# Patient Record
Sex: MALE | NOT HISPANIC OR LATINO | Employment: FULL TIME | URBAN - METROPOLITAN AREA
[De-identification: names, ages, dates, MRNs, and addresses within clinical notes are randomized per-mention and may not be internally consistent; named-entity substitution may affect disease eponyms.]

---

## 2023-12-24 ENCOUNTER — HOSPITAL ENCOUNTER (EMERGENCY)
Facility: HOSPITAL | Age: 23
Discharge: HOME | End: 2023-12-24
Attending: STUDENT IN AN ORGANIZED HEALTH CARE EDUCATION/TRAINING PROGRAM
Payer: COMMERCIAL

## 2023-12-24 VITALS
HEIGHT: 65 IN | HEART RATE: 97 BPM | BODY MASS INDEX: 29.05 KG/M2 | RESPIRATION RATE: 16 BRPM | WEIGHT: 174.38 LBS | SYSTOLIC BLOOD PRESSURE: 127 MMHG | DIASTOLIC BLOOD PRESSURE: 85 MMHG | OXYGEN SATURATION: 100 % | TEMPERATURE: 98.1 F

## 2023-12-24 DIAGNOSIS — S61.211A LACERATION OF LEFT INDEX FINGER WITHOUT FOREIGN BODY WITHOUT DAMAGE TO NAIL, INITIAL ENCOUNTER: Primary | ICD-10-CM

## 2023-12-24 PROCEDURE — 99282 EMERGENCY DEPT VISIT SF MDM: CPT | Mod: 25

## 2023-12-24 PROCEDURE — 99284 EMERGENCY DEPT VISIT MOD MDM: CPT | Performed by: STUDENT IN AN ORGANIZED HEALTH CARE EDUCATION/TRAINING PROGRAM

## 2023-12-24 PROCEDURE — 2500000004 HC RX 250 GENERAL PHARMACY W/ HCPCS (ALT 636 FOR OP/ED): Performed by: STUDENT IN AN ORGANIZED HEALTH CARE EDUCATION/TRAINING PROGRAM

## 2023-12-24 PROCEDURE — 90715 TDAP VACCINE 7 YRS/> IM: CPT | Performed by: STUDENT IN AN ORGANIZED HEALTH CARE EDUCATION/TRAINING PROGRAM

## 2023-12-24 PROCEDURE — 90471 IMMUNIZATION ADMIN: CPT | Performed by: STUDENT IN AN ORGANIZED HEALTH CARE EDUCATION/TRAINING PROGRAM

## 2023-12-24 RX ORDER — SERTRALINE HYDROCHLORIDE 25 MG/1
TABLET, FILM COATED ORAL
COMMUNITY

## 2023-12-24 RX ORDER — ESCITALOPRAM OXALATE 10 MG/1
TABLET ORAL EVERY 24 HOURS
COMMUNITY

## 2023-12-24 RX ORDER — METHYLPHENIDATE HYDROCHLORIDE 27 MG/1
TABLET ORAL EVERY 24 HOURS
COMMUNITY

## 2023-12-24 RX ADMIN — TETANUS TOXOID, REDUCED DIPHTHERIA TOXOID AND ACELLULAR PERTUSSIS VACCINE, ADSORBED 0.5 ML: 5; 2.5; 8; 8; 2.5 SUSPENSION INTRAMUSCULAR at 07:37

## 2023-12-24 ASSESSMENT — PAIN SCALES - GENERAL
PAINLEVEL_OUTOF10: 8
PAINLEVEL_OUTOF10: 5 - MODERATE PAIN

## 2023-12-24 ASSESSMENT — PAIN - FUNCTIONAL ASSESSMENT
PAIN_FUNCTIONAL_ASSESSMENT: 0-10
PAIN_FUNCTIONAL_ASSESSMENT: 0-10

## 2023-12-24 ASSESSMENT — COLUMBIA-SUICIDE SEVERITY RATING SCALE - C-SSRS
2. HAVE YOU ACTUALLY HAD ANY THOUGHTS OF KILLING YOURSELF?: NO
2. HAVE YOU ACTUALLY HAD ANY THOUGHTS OF KILLING YOURSELF?: NO
6. HAVE YOU EVER DONE ANYTHING, STARTED TO DO ANYTHING, OR PREPARED TO DO ANYTHING TO END YOUR LIFE?: NO
6. HAVE YOU EVER DONE ANYTHING, STARTED TO DO ANYTHING, OR PREPARED TO DO ANYTHING TO END YOUR LIFE?: NO
1. IN THE PAST MONTH, HAVE YOU WISHED YOU WERE DEAD OR WISHED YOU COULD GO TO SLEEP AND NOT WAKE UP?: NO
1. IN THE PAST MONTH, HAVE YOU WISHED YOU WERE DEAD OR WISHED YOU COULD GO TO SLEEP AND NOT WAKE UP?: NO

## 2023-12-24 ASSESSMENT — PAIN DESCRIPTION - LOCATION: LOCATION: FINGER (COMMENT WHICH ONE)

## 2023-12-24 ASSESSMENT — PAIN DESCRIPTION - ORIENTATION: ORIENTATION: LEFT

## 2023-12-24 NOTE — ED PROVIDER NOTES
HPI   Chief Complaint   Patient presents with    Finger Laceration     Pt lacerated the tip of his left index finger around 2100 last night with a knife while opening a bag.  Bleeding controlled.        Patient presents with finger laceration.  He reports that yesterday, he accidentally cut himself with a knife.  He states that the tip of the finger was affected.  He states that he did bandage it last night but as it continues to bleed today, it was recommended that he come to the hospital for a proper dressing.  Medical history significant for asthma and Junior-Parkinson-White status post ablation.                          [place] Coma Scale Score: 15                  Patient History   Past Medical History:   Diagnosis Date    Personal history of other mental and behavioral disorders     History of attention deficit hyperactivity disorder (ADHD)     No past surgical history on file.  No family history on file.  Social History     Tobacco Use    Smoking status: Not on file    Smokeless tobacco: Not on file   Substance Use Topics    Alcohol use: Not on file    Drug use: Not on file       Physical Exam   ED Triage Vitals [12/24/23 0717]   Temp Heart Rate Resp BP   36.7 °C (98.1 °F) 97 16 127/85      SpO2 Temp src Heart Rate Source Patient Position   100 % -- -- --      BP Location FiO2 (%)     -- --       Physical Exam  Cardiovascular:      Rate and Rhythm: Normal rate.   Skin:     Comments: 0.5 cm laceration at tip of left finger involving skin avulsion.  Active bleeding.  Does not involve the nail.   Neurological:      Mental Status: He is alert.         ED Course & MDM   Diagnoses as of 12/24/23 0705   Laceration of left index finger without foreign body without damage to nail, initial encounter       Medical Decision Making  The laceration is extremely distal.  My suspicion of bony involvement, tendon involvement, or neurovascular involvement is very low.  Tetanus vaccine updated.  Surgicel placed.  Patient advised  to follow-up with primary care physician and change dressing daily.  Return precautions given for any signs of infection.  Parts of this chart were completed with dictation software, please excuse any errors in transcription.        Procedure  Procedures     Oz Diaz MD  12/24/23 6972

## 2024-05-28 ENCOUNTER — HOSPITAL ENCOUNTER (EMERGENCY)
Facility: HOSPITAL | Age: 24
Discharge: HOME | End: 2024-05-29
Attending: EMERGENCY MEDICINE
Payer: COMMERCIAL

## 2024-05-28 DIAGNOSIS — F10.929 ALCOHOLIC INTOXICATION WITH COMPLICATION (CMS-HCC): Primary | ICD-10-CM

## 2024-05-28 PROCEDURE — 87635 SARS-COV-2 COVID-19 AMP PRB: CPT | Performed by: EMERGENCY MEDICINE

## 2024-05-28 PROCEDURE — 85025 COMPLETE CBC W/AUTO DIFF WBC: CPT | Performed by: EMERGENCY MEDICINE

## 2024-05-28 PROCEDURE — 80143 DRUG ASSAY ACETAMINOPHEN: CPT | Performed by: EMERGENCY MEDICINE

## 2024-05-28 PROCEDURE — 36415 COLL VENOUS BLD VENIPUNCTURE: CPT | Performed by: EMERGENCY MEDICINE

## 2024-05-28 PROCEDURE — 93005 ELECTROCARDIOGRAM TRACING: CPT

## 2024-05-28 PROCEDURE — 80053 COMPREHEN METABOLIC PANEL: CPT | Performed by: EMERGENCY MEDICINE

## 2024-05-28 PROCEDURE — 99284 EMERGENCY DEPT VISIT MOD MDM: CPT | Mod: CS

## 2024-05-28 NOTE — Clinical Note
Ricardo Harish was seen and treated in our emergency department on 5/28/2024.  He may return to work on 05/30/2024.       If you have any questions or concerns, please don't hesitate to call.      Kunal Stinson MD

## 2024-05-29 ENCOUNTER — APPOINTMENT (OUTPATIENT)
Dept: CARDIOLOGY | Facility: HOSPITAL | Age: 24
End: 2024-05-29
Payer: COMMERCIAL

## 2024-05-29 VITALS
HEART RATE: 83 BPM | RESPIRATION RATE: 18 BRPM | WEIGHT: 190 LBS | OXYGEN SATURATION: 99 % | BODY MASS INDEX: 27.2 KG/M2 | TEMPERATURE: 98.2 F | DIASTOLIC BLOOD PRESSURE: 80 MMHG | HEIGHT: 70 IN | SYSTOLIC BLOOD PRESSURE: 118 MMHG

## 2024-05-29 LAB
ALBUMIN SERPL-MCNC: 4.9 G/DL (ref 3.5–5)
ALP BLD-CCNC: 58 U/L (ref 35–125)
ALT SERPL-CCNC: 19 U/L (ref 5–40)
AMPHETAMINES UR QL SCN>1000 NG/ML: NEGATIVE
ANION GAP SERPL CALC-SCNC: 15 MMOL/L
APAP SERPL-MCNC: <5 UG/ML
AST SERPL-CCNC: 22 U/L (ref 5–40)
BARBITURATES UR QL SCN>300 NG/ML: NEGATIVE
BASOPHILS # BLD AUTO: 0.02 X10*3/UL (ref 0–0.1)
BASOPHILS NFR BLD AUTO: 0.3 %
BENZODIAZ UR QL SCN>300 NG/ML: NEGATIVE
BILIRUB SERPL-MCNC: 0.5 MG/DL (ref 0.1–1.2)
BUN SERPL-MCNC: 16 MG/DL (ref 8–25)
BZE UR QL SCN>300 NG/ML: NEGATIVE
CALCIUM SERPL-MCNC: 9.4 MG/DL (ref 8.5–10.4)
CANNABINOIDS UR QL SCN>50 NG/ML: POSITIVE
CHLORIDE SERPL-SCNC: 103 MMOL/L (ref 97–107)
CO2 SERPL-SCNC: 24 MMOL/L (ref 24–31)
CREAT SERPL-MCNC: 1.1 MG/DL (ref 0.4–1.6)
EGFRCR SERPLBLD CKD-EPI 2021: >90 ML/MIN/1.73M*2
EOSINOPHIL # BLD AUTO: 0.07 X10*3/UL (ref 0–0.7)
EOSINOPHIL NFR BLD AUTO: 1.1 %
ERYTHROCYTE [DISTWIDTH] IN BLOOD BY AUTOMATED COUNT: 11.7 % (ref 11.5–14.5)
ETHANOL SERPL-MCNC: 0.17 G/DL
FENTANYL+NORFENTANYL UR QL SCN: NEGATIVE
GLUCOSE SERPL-MCNC: 115 MG/DL (ref 65–99)
HCT VFR BLD AUTO: 44.2 % (ref 41–52)
HGB BLD-MCNC: 15.9 G/DL (ref 13.5–17.5)
IMM GRANULOCYTES # BLD AUTO: 0.01 X10*3/UL (ref 0–0.7)
IMM GRANULOCYTES NFR BLD AUTO: 0.2 % (ref 0–0.9)
LYMPHOCYTES # BLD AUTO: 2.27 X10*3/UL (ref 1.2–4.8)
LYMPHOCYTES NFR BLD AUTO: 34.2 %
MCH RBC QN AUTO: 31.8 PG (ref 26–34)
MCHC RBC AUTO-ENTMCNC: 36 G/DL (ref 32–36)
MCV RBC AUTO: 88 FL (ref 80–100)
METHADONE UR QL SCN>300 NG/ML: NEGATIVE
MONOCYTES # BLD AUTO: 0.85 X10*3/UL (ref 0.1–1)
MONOCYTES NFR BLD AUTO: 12.8 %
NEUTROPHILS # BLD AUTO: 3.42 X10*3/UL (ref 1.2–7.7)
NEUTROPHILS NFR BLD AUTO: 51.4 %
NRBC BLD-RTO: NORMAL /100{WBCS}
OPIATES UR QL SCN>300 NG/ML: NEGATIVE
OXYCODONE UR QL: NEGATIVE
PCP UR QL SCN>25 NG/ML: NEGATIVE
PLATELET # BLD AUTO: 277 X10*3/UL (ref 150–450)
POTASSIUM SERPL-SCNC: 3.3 MMOL/L (ref 3.4–5.1)
PROT SERPL-MCNC: 7.5 G/DL (ref 5.9–7.9)
RBC # BLD AUTO: 5 X10*6/UL (ref 4.5–5.9)
SALICYLATES SERPL-MCNC: <0 MG/DL
SARS-COV-2 RNA RESP QL NAA+PROBE: NOT DETECTED
SODIUM SERPL-SCNC: 142 MMOL/L (ref 133–145)
WBC # BLD AUTO: 6.6 X10*3/UL (ref 4.4–11.3)

## 2024-05-29 PROCEDURE — 80307 DRUG TEST PRSMV CHEM ANLYZR: CPT | Performed by: EMERGENCY MEDICINE

## 2024-05-29 NOTE — DISCHARGE INSTRUCTIONS
Do not drink alcohol    
Pt with lac to lip. Lac repaired. Will discharge. FB in the lip noted, mother notified. Will discharge.

## 2024-05-29 NOTE — PROGRESS NOTES
"00:00-00:30 CALLY did M&G with pt and pt family. Pt mom, dad and brother were present. 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 Per dad, pt drinks on occasion appx 2-6 hard ciders/seltzers. Per mom, pt doesn't feel his antidepressants are working (Lexapro and Wellbutrin) but pt ADHD/ADD meds (Concentra) are effective. Per family, pt has not verbalized any SI/HI but reported being \"under stress\" with job and working long hours. Per mom, pt sees a psychiatrist at [facility] and pt therapist (who was with [facility], moved to [place] and pt does telehealth EOW). Pt brother advised pt smoked \"a few puffs of weed\" tonight. Pt BAL upon arrival was a .171. SW asked parents if the pt has had phenogenetic testing done, per mom no. SW gave family information on test. ED Doc Milad of M&G. Once pt is legally sober (appx 05:00), SW will consult and offer community resources and coping skills handout.   "

## 2024-05-29 NOTE — PROGRESS NOTES
"05:49-06:05 SW consulted with pt. Pt reported he has been under \"a lot of stress\" due to school and work. 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 Pt denied SI/HI/delusions/AH/VH/intent or plan. Pt reported a decrease in sleep due to ff schedule of 24 hours on. Pt reported his appetite as unchanged. Pt reported since a psychiatrist at [facility]. Pt reported not remembering his name. Pt reported having a therapist he sees via telehealth. Pt reported having a MH dx of depression. Pt reported being prescribed Concerta for ADHD as well as Lexapro and wellbutrin. Pt reported DC his lexapro and wellbutrin for \"a while\" since he felt it \"really didn't work\". Pt reported the meds didn't \"work fast enough\". SW educated to the time antidepressants work. Pt gave verbal consent to send agency notification to [facility] for psych provider to follow up. CALLY gave pt and pt family info on phenogentic testing. Pt was accepting of info and can be DC home .     Gabrielle Elizondo, WANGW      "

## 2024-05-29 NOTE — ED PROVIDER NOTES
"HPI   Chief Complaint   Patient presents with    Acute Intoxication    Depression       HPI     23-year-old male brought to the emergency department by EMS for evaluation of intoxication and saying that \"it is all too much\"    Patient has a history of major depression according to EMS  He shared with EMS that he has been under a great deal of stress    In the emergency department he is not speaking much and is tearful     Denies any headache, chest pain or abdominal pain          [place] Coma Scale Score: 9                     Patient History   Past Medical History:   Diagnosis Date    Celiac disease ([facility]-HCC)     Personal history of other mental and behavioral disorders     History of attention deficit hyperactivity disorder (ADHD)    WPW (Junior-Parkinson-White syndrome)      Past Surgical History:   Procedure Laterality Date    CARDIAC ELECTROPHYSIOLOGY STUDY AND ABLATION       No family history on file.  Social History     Tobacco Use    Smoking status: Never    Smokeless tobacco: Never   Substance Use Topics    Alcohol use: Not on file    Drug use: Not on file       Physical Exam   ED Triage Vitals [05/28/24 2312]   Temperature Heart Rate Respirations BP   36.2 °C (97.2 °F) 88 18 (!) 114/95      Pulse Ox Temp Source Heart Rate Source Patient Position   95 % Temporal -- --      BP Location FiO2 (%)     -- --       Physical Exam  Vitals and nursing note reviewed.   Constitutional:       General: He is not in acute distress.     Appearance: He is well-developed.      Comments: 23-year-old white male brought in by EMS.  Smells of EtOH.  Tearful.  Not speaking much but is cooperative and getting out of the EMS cot and onto the bed   HENT:      Head: Normocephalic and atraumatic.   Eyes:      Conjunctiva/sclera: Conjunctivae normal.   Cardiovascular:      Rate and Rhythm: Normal rate and regular rhythm.      Heart sounds: No murmur heard.  Pulmonary:      Effort: Pulmonary effort is normal. No respiratory distress.    " "  Breath sounds: Normal breath sounds.   Abdominal:      Palpations: Abdomen is soft.      Tenderness: There is no abdominal tenderness.   Musculoskeletal:         General: No swelling.      Cervical back: Neck supple.   Skin:     General: Skin is warm and dry.      Capillary Refill: Capillary refill takes less than 2 seconds.   Neurological:      Mental Status: He is alert.   Psychiatric:         Mood and Affect: Mood is depressed.         Thought Content: Thought content includes suicidal ideation.         ED Course & MDM   ED Course as of 05/29/24 0534   Tue May 28, 2024   2323 Normal sinus rhythm with a rate of 95.  No evidence of ischemia.  No previous EKG for comparison [JN]   2334 Normal sinus rhythm with a rate of 95.  OR interval is 152.  QRS duration is 88  Nonspecific T wave abnormalities [JN]      ED Course User Index  [JN] Kunal Stinson MD         Diagnoses as of 05/29/24 0534   Alcoholic intoxication with complication (CMS-HCC)       Medical Decision Making       23-year-old male brought to the emergency department by EMS for evaluation of intoxication and saying that \"it is all too much\"  Patient has a history of major depression according to EMS  He shared with EMS that he has been under a great deal of stress  In the emergency department he is not speaking much and is tearful  Denies any headache, chest pain or abdominal pain      Physical Exam  Vitals and nursing note reviewed.   Constitutional:       General: He is not in acute distress.     Appearance: He is well-developed.      Comments: 23-year-old white male brought in by EMS.  Smells of EtOH.  Tearful.  Not speaking much but is cooperative and getting out of the EMS cot and onto the bed   HENT:      Head: Normocephalic and atraumatic.   Eyes:      Conjunctiva/sclera: Conjunctivae normal.   Cardiovascular:      Rate and Rhythm: Normal rate and regular rhythm.      Heart sounds: No murmur heard.    Update 3:46 AM  Blood alcohol level " is 171  Patient was seen and evaluated by the psychiatric team who met with the family  No major concerns for suicidality at this time  He will be reevaluated when his blood alcohol level is lower    Update 5:33 AM  Patient is clinically sober  Denying any thoughts of suicide or homicidal ideation  Parents are going to take him home  He will follow-up with his counselor and family doctor or return if worse  Strongly advised to avoid alcohol      Labs Reviewed   COMPREHENSIVE METABOLIC PANEL - Abnormal       Result Value    Glucose 115 (*)     Sodium 142      Potassium 3.3 (*)     Chloride 103      Bicarbonate 24      Urea Nitrogen 16      Creatinine 1.10      eGFR >90      Calcium 9.4      Albumin 4.9      Alkaline Phosphatase 58      Total Protein 7.5      AST 22      Bilirubin, Total 0.5      ALT 19      Anion Gap 15     ACUTE TOXICOLOGY PANEL, BLOOD - Abnormal    Acetaminophen <5.0      Salicylate  <0      Alcohol 0.171 (*)    SARS-COV-2 PCR - Normal    Coronavirus 2019, PCR Not Detected      Narrative:     This assay has received FDA Emergency Use Authorization (EUA) and is only authorized for the duration of time that circumstances exist to justify the authorization of the emergency use of in vitro diagnostic tests for the detection of SARS-CoV-2 virus and/or diagnosis of COVID-19 infection under section 564(b)(1) of the Act, 21 U.S.C. 360bbb-3(b)(1). This assay is an in vitro diagnostic nucleic acid amplification test for the qualitative detection of SARS-CoV-2 from nasopharyngeal specimens and has been validated for use at [facility]. Negative results do not preclude COVID-19 infections and should not be used as the sole basis for diagnosis, treatment, or other management decisions.     CBC WITH AUTO DIFFERENTIAL    WBC 6.6      nRBC        RBC 5.00      Hemoglobin 15.9      Hematocrit 44.2      MCV 88      MCH 31.8      MCHC 36.0      RDW 11.7      Platelets 277      Neutrophils %  51.4      Immature Granulocytes %, Automated 0.2      Lymphocytes % 34.2      Monocytes % 12.8      Eosinophils % 1.1      Basophils % 0.3      Neutrophils Absolute 3.42      Immature Granulocytes Absolute, Automated 0.01      Lymphocytes Absolute 2.27      Monocytes Absolute 0.85      Eosinophils Absolute 0.07      Basophils Absolute 0.02     DRUG SCREEN,URINE   ALCOHOL           Procedure  Procedures     Kunal Stinson MD  05/29/24 8868

## 2024-06-03 LAB
ATRIAL RATE: 95 BPM
P AXIS: 53 DEGREES
P OFFSET: 188 MS
P ONSET: 141 MS
PR INTERVAL: 152 MS
Q ONSET: 217 MS
QRS COUNT: 16 BEATS
QRS DURATION: 88 MS
QT INTERVAL: 342 MS
QTC CALCULATION(BAZETT): 429 MS
QTC FREDERICIA: 398 MS
R AXIS: 61 DEGREES
T AXIS: 0 DEGREES
T OFFSET: 388 MS
VENTRICULAR RATE: 95 BPM

## 2024-07-30 NOTE — DISCHARGE INSTRUCTIONS
Notified pt, states she will call to schedule another appt with us later.    You should change the dressing on your finger daily.  Watch closely for any signs of infection.  Return to the emergency department if any worsening symptoms occur.    It is important to remember that your care does not end here and you must continue to monitor your condition closely. Please return to the emergency department for any worsening or concerning signs or symptoms as directed by our conversations and the discharge instructions. If you do not have a doctor please contact the referral number on your discharge instructions. Please contact any physician specialists provided in your discharge notes as it is very important to follow up with them regarding your condition. If you are unable to reach the physicians provided, please come back to the Emergency Department at any time.    Return to emergency room without delay for ANY new or worsening pains or for any other symptoms or concerns.  Return with worsening pains, nausea, vomiting, trouble breathing, palpitations, shortness of breath, inability to pass stool or urine, loss of control of stool or urine, any numbness or tingling (that is not normal for you), uncontrolled fevers, the passing of blood or other material in stool or urine, rashes, pains or for any other symptoms or concerns you may have.  You are always welcome to return to the ER at any time for any reason or for any other concerns you may have.

## 2024-12-03 ENCOUNTER — HOSPITAL ENCOUNTER (EMERGENCY)
Facility: HOSPITAL | Age: 24
Discharge: HOME | End: 2024-12-03
Payer: COMMERCIAL

## 2024-12-03 ENCOUNTER — PHARMACY VISIT (OUTPATIENT)
Dept: PHARMACY | Facility: CLINIC | Age: 24
End: 2024-12-03
Payer: COMMERCIAL

## 2024-12-03 VITALS
DIASTOLIC BLOOD PRESSURE: 98 MMHG | OXYGEN SATURATION: 97 % | HEART RATE: 72 BPM | RESPIRATION RATE: 20 BRPM | WEIGHT: 192 LBS | TEMPERATURE: 98.7 F | BODY MASS INDEX: 27.55 KG/M2 | SYSTOLIC BLOOD PRESSURE: 139 MMHG
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
## 2024-12-03 PROCEDURE — RXMED WILLOW AMBULATORY MEDICATION CHARGE

## 2024-12-03 PROCEDURE — 99283 EMERGENCY DEPT VISIT LOW MDM: CPT

## 2024-12-03 PROCEDURE — 87389 HIV-1 AG W/HIV-1&-2 AB AG IA: CPT | Mod: TRILAB

## 2024-12-03 PROCEDURE — 36415 COLL VENOUS BLD VENIPUNCTURE: CPT

## 2024-12-03 PROCEDURE — 87340 HEPATITIS B SURFACE AG IA: CPT | Mod: TRILAB

## 2024-12-03 PROCEDURE — 86803 HEPATITIS C AB TEST: CPT | Mod: TRILAB

## 2024-12-03 PROCEDURE — 86706 HEP B SURFACE ANTIBODY: CPT | Mod: TRILAB

## 2024-12-03 PROCEDURE — 99281 EMR DPT VST MAYX REQ PHY/QHP: CPT

## 2024-12-03 RX ORDER — EMTRICITABINE AND TENOFOVIR DISOPROXIL FUMARATE 200; 300 MG/1; MG/1
1 TABLET, FILM COATED ORAL DAILY
Qty: 30 TABLET | Refills: 0 | Status: SHIPPED | OUTPATIENT
Start: 2024-12-03 | End: 2025-01-02

## 2024-12-03 RX ORDER — ONDANSETRON 4 MG/1
4 TABLET, ORALLY DISINTEGRATING ORAL EVERY 12 HOURS PRN
Qty: 28 TABLET | Refills: 0 | Status: SHIPPED | OUTPATIENT
Start: 2024-12-03 | End: 2024-12-17

## 2024-12-03 ASSESSMENT — PAIN SCALES - GENERAL
PAINLEVEL_OUTOF10: 0 - NO PAIN
PAINLEVEL_OUTOF10: 0 - NO PAIN

## 2024-12-03 ASSESSMENT — PAIN - FUNCTIONAL ASSESSMENT
PAIN_FUNCTIONAL_ASSESSMENT: 0-10
PAIN_FUNCTIONAL_ASSESSMENT: 0-10

## 2024-12-03 NOTE — ED PROVIDER NOTES
HPI   Chief Complaint   Patient presents with    Injury     Stuck by dirty needle while at Clinical.        Patient is a 24-year-old male presenting after a needlestick incident.  He states that he was doing a straight stick on a patient when he excellently poked his right middle digit.  He states that he did clean the area with soap and water but was informed to present to the emergency department.  He states that the patient's medical history is significant for renal disease on dialysis.  Patient denies fevers, chills, cough, sore throat, runny nose, chest pain, shortness of breath, abdominal pain, nausea, vomiting, diarrhea or urinary complaints.              Patient History   Past Medical History:   Diagnosis Date    Celiac disease ([facility]-HCC)     Personal history of other mental and behavioral disorders     History of attention deficit hyperactivity disorder (ADHD)    WPW (Junior-Parkinson-White syndrome)      Past Surgical History:   Procedure Laterality Date    CARDIAC ELECTROPHYSIOLOGY STUDY AND ABLATION       No family history on file.  Social History     Tobacco Use    Smoking status: Never    Smokeless tobacco: Never   Substance Use Topics    Alcohol use: Not on file    Drug use: Not on file       Physical Exam   ED Triage Vitals   Temp Pulse Resp BP   -- -- -- --      SpO2 Temp src Heart Rate Source Patient Position   -- -- -- --      BP Location FiO2 (%)     -- --       Physical Exam  Vitals and nursing note reviewed.   Constitutional:       Appearance: He is well-developed.   HENT:      Head: Normocephalic and atraumatic.      Nose: Nose normal.      Mouth/Throat:      Mouth: Mucous membranes are moist.      Pharynx: Oropharynx is clear.   Eyes:      Extraocular Movements: Extraocular movements intact.      Conjunctiva/sclera: Conjunctivae normal.      Pupils: Pupils are equal, round, and reactive to light.   Cardiovascular:      Rate and Rhythm: Normal rate and regular rhythm.      Pulses: Normal pulses.       Heart sounds: Normal heart sounds. No murmur heard.  Pulmonary:      Effort: Pulmonary effort is normal. No respiratory distress.      Breath sounds: Normal breath sounds.   Abdominal:      General: Abdomen is flat.      Palpations: Abdomen is soft.      Tenderness: There is no abdominal tenderness.   Musculoskeletal:         General: No swelling. Normal range of motion.      Cervical back: Normal range of motion and neck supple.   Skin:     General: Skin is warm and dry.      Capillary Refill: Capillary refill takes less than 2 seconds.      Comments: There is a small puncture wound on the dorsal aspect of the distal right third digit   Neurological:      General: No focal deficit present.      Mental Status: He is alert and oriented to person, place, and time.   Psychiatric:         Mood and Affect: Mood normal.         Behavior: Behavior normal.           ED Course & MDM   Diagnoses as of 12/03/24 1629   Needlestick injury accident with exposure to body fluid                 No data recorded                                 Medical Decision Making  Patient is a 24-year-old male presenting after needlestick incident.  Lab work ordered.  Conditions considered include but are not limited to: Exposure.    I did discuss with patient that these labs would not return today.  He is understandable to this.  Patient has elected to start prophylactic exposure medications.  After discussions with nurse and ED pharmacist, prescriptions were written.  Meds to bed were given and patient was educated on the appropriate dosing and timing of the medications.    I believe this patient is at low risk for complication, and a disposition of discharge is acceptable.  Return to the Emergency Department if new or worsening symptoms including headache, fever, chills, chest pain, shortness of breath, syncope, near syncope, abdominal pain, nausea, vomiting,  diarrhea, or worsening pain.  Patient is agreeable to a disposition of  discharge with follow-up with primary care within the next several days and to take medications prescribed.    Portions of this note made with Dragon software, please be mindful of potential grammatical errors.        Labs Reviewed   HEPATITIS B SURFACE ANTIBODY   HEPATITIS C ANTIBODY   HIV 1/2 ANTIGEN/ANTIBODY SCREEN [facility] REFLEX TO CONFIRMATION   HEPATITIS B SURFACE ANTIGEN       Procedure  Procedures     Anoop Finney PA-C  12/03/24 1312

## 2024-12-03 NOTE — Clinical Note
Ricardo Harish was seen and treated in our emergency department on 12/3/2024.  He may return to work on 12/05/2024.       If you have any questions or concerns, please don't hesitate to call.      Anoop Finney PA-C

## 2024-12-03 NOTE — DISCHARGE INSTRUCTIONS
Please take your medications as prescribed and follow-up with primary care within the next several days.    Be sure to take all medications, over the counter medications or prescription medications only as directed.    Be sure to follow up as directed in 1-2 days. All of the details of your follow up instructions are detailed in the follow up section of this packet.    If you are being discharged with any pains medications or muscle relaxers (norco, Vicodin, hydrocodone products, Percocet, oxycodone products, flexeril, cyclobenzaprine, robaxin, norflex, brand or generic, or any other pain controlling medications with the exception of Ibuprofen and regular Tylenol, do not drive or operate machinery, climb ladders or participate in any activity that could potentially put yourself or others at risk should you get dizzy, or be/feel impaired at all.    Return to emergency room without delay for ANY new or worsening pains or for any other symptoms or concerns. Return with worsening pains, nausea, vomiting, trouble breathing, palpitations, shortness of breath, inability to pass stool or urine, loss of control of stool or urine, any numbness or tingling (that is not normal for you), uncontrolled fevers, the passing of blood or other material in stool or urine, rashes, pains or for any other symptoms or concerns you may have. You are always welcome to return to the ER at any time for any reason or for any other concerns you may have.

## 2024-12-04 LAB
HBV SURFACE AB SER-ACNC: 67.3 MIU/ML
HBV SURFACE AG SERPL QL IA: NONREACTIVE
HCV AB SER QL: NONREACTIVE
HIV 1+2 AB+HIV1 P24 AG SERPL QL IA: NONREACTIVE

## 2024-12-06 ENCOUNTER — TELEPHONE (OUTPATIENT)
Dept: PHARMACY | Facility: HOSPITAL | Age: 24
End: 2024-12-06
Payer: COMMERCIAL

## 2024-12-06 NOTE — TELEPHONE ENCOUNTER
EDPD Note: Rapid Result Review    I reviewed Ricardo Rolandbranmontrell 's chart regarding a positive Hepatitis B result that was taken during their recent emergency room visit. The patient was not told about these results prior to leaving the emergency department. Therefore, patient was contacted and given proper education.     No results found for the last 90 days.    Patient presented to the ED for Hepatitis B screening after a finger stick during clinicals. He obtained a needlestick when doing a straight stick on a patient and accidentally poked his right middle digit. Cleaned area with soap and water and presented to the ED. Patient was started on prophylactic exposure medication therapy.     Surface antibody was positive and surface antigen was negative, indicating no active infection and protection from vaccination. Confirm vaccination status with patient.     Exposure prophylaxis medications can be discontinued due to negative Hep B and HIV screening.    No further follow up needed from EDPD Team.     If there are any other questions for the ED Post-Discharge Culture Follow Up Team, please contact [phone]. Fax: [phone].    Alley Newton, BeltranD

## 2024-12-06 NOTE — PROGRESS NOTES
EDPD Note: Rapid Result Review    I reviewed Ricardo Rolandbranmontrell 's chart regarding a positive Hepatitis B result that was taken during their recent emergency room visit. The patient was not told about these results prior to leaving the emergency department. Therefore, patient was contacted and given proper education.     No results found for the last 90 days.    Patient presented to the ED for Hepatitis B screening after a finger stick during clinicals. He obtained a needlestick when doing a straight stick on a patient and accidentally poked his right middle digit. Cleaned area with soap and water and presented to the ED. Patient was started on prophylactic exposure medication therapy.     Surface antibody was positive and surface antigen was negative, indicating no active infection and protection from vaccination. Confirm vaccination status with patient.     Exposure prophylaxis medications can be discontinued due to negative Hep B and HIV screening.    No further follow up needed from EDPD Team.     If there are any other questions for the ED Post-Discharge Culture Follow Up Team, please contact [phone]. Fax: [phone].    Alley Newton, BeltranD